# Patient Record
Sex: MALE | Race: BLACK OR AFRICAN AMERICAN | URBAN - METROPOLITAN AREA
[De-identification: names, ages, dates, MRNs, and addresses within clinical notes are randomized per-mention and may not be internally consistent; named-entity substitution may affect disease eponyms.]

---

## 2023-08-29 ENCOUNTER — OFFICE (OUTPATIENT)
Dept: URBAN - METROPOLITAN AREA CLINIC 19 | Facility: CLINIC | Age: 38
End: 2023-08-29

## 2023-08-29 VITALS
WEIGHT: 166 LBS | SYSTOLIC BLOOD PRESSURE: 118 MMHG | HEIGHT: 75 IN | HEART RATE: 70 BPM | OXYGEN SATURATION: 100 % | DIASTOLIC BLOOD PRESSURE: 75 MMHG

## 2023-08-29 DIAGNOSIS — E73.9 LACTOSE INTOLERANCE, UNSPECIFIED: ICD-10-CM

## 2023-08-29 DIAGNOSIS — R19.7 DIARRHEA, UNSPECIFIED: ICD-10-CM

## 2023-08-29 PROCEDURE — 99203 OFFICE O/P NEW LOW 30 MIN: CPT | Performed by: NURSE PRACTITIONER

## 2023-08-29 NOTE — SERVICEHPINOTES
Mr. Joel is a 38-year-old male with PMH including asthma, HH, robotic inguinal hernia repair in 2021 with mesh placement.br
darlene   He presents to clinic today with c/o diarrhea.  Onset was a little over 2 weeks ago.  States he is having diarrhea immediately postprandial about 3 per day.  Described as brown, loose / liquid stool.  Prior to this his baseline bowel regimen was soft stool every 2-3 days.
br
darleneHe has some crampy abdominal pain immediately preceding the BM.  Some mild anorexia.
br
brNo melena, hematochezia, fever, nausea / vomiting, heartburn, weight loss, rashes, rectal pain.
br
brNo NSAID use.  No herbals or supplements, new medications.  No recent antibiotics.  No well water.  He did travel to [place] this past weekend but no other traveling. He has lactose intolerance. 
br 
br
No h/o GB disease.

## 2023-08-31 LAB
CBC WITH DIFFERENTIAL/PLATELET: BASO (ABSOLUTE): 0 X10E3/UL (ref 0–0.2)
CBC WITH DIFFERENTIAL/PLATELET: BASOS: 0 %
CBC WITH DIFFERENTIAL/PLATELET: EOS (ABSOLUTE): 0.1 X10E3/UL (ref 0–0.4)
CBC WITH DIFFERENTIAL/PLATELET: EOS: 2 %
CBC WITH DIFFERENTIAL/PLATELET: HEMATOCRIT: 43.1 % (ref 37.5–51)
CBC WITH DIFFERENTIAL/PLATELET: HEMOGLOBIN: 14.2 G/DL (ref 13–17.7)
CBC WITH DIFFERENTIAL/PLATELET: IMMATURE GRANS (ABS): 0 X10E3/UL (ref 0–0.1)
CBC WITH DIFFERENTIAL/PLATELET: IMMATURE GRANULOCYTES: 0 %
CBC WITH DIFFERENTIAL/PLATELET: LYMPHS (ABSOLUTE): 1.2 X10E3/UL (ref 0.7–3.1)
CBC WITH DIFFERENTIAL/PLATELET: LYMPHS: 36 %
CBC WITH DIFFERENTIAL/PLATELET: MCH: 31.6 PG (ref 26.6–33)
CBC WITH DIFFERENTIAL/PLATELET: MCHC: 32.9 G/DL (ref 31.5–35.7)
CBC WITH DIFFERENTIAL/PLATELET: MCV: 96 FL (ref 79–97)
CBC WITH DIFFERENTIAL/PLATELET: MONOCYTES(ABSOLUTE): 0.3 X10E3/UL (ref 0.1–0.9)
CBC WITH DIFFERENTIAL/PLATELET: MONOCYTES: 9 %
CBC WITH DIFFERENTIAL/PLATELET: NEUTROPHILS (ABSOLUTE): 1.8 X10E3/UL (ref 1.4–7)
CBC WITH DIFFERENTIAL/PLATELET: NEUTROPHILS: 53 %
CBC WITH DIFFERENTIAL/PLATELET: PLATELETS: 231 X10E3/UL (ref 150–450)
CBC WITH DIFFERENTIAL/PLATELET: RBC: 4.5 X10E6/UL (ref 4.14–5.8)
CBC WITH DIFFERENTIAL/PLATELET: RDW: 12.8 % (ref 11.6–15.4)
CBC WITH DIFFERENTIAL/PLATELET: WBC: 3.4 X10E3/UL (ref 3.4–10.8)
CELIAC AB TTG DGP TIGA: DEAMIDATED GLIADIN ABS, IGA: 3 UNITS (ref 0–19)
CELIAC AB TTG DGP TIGA: DEAMIDATED GLIADIN ABS, IGG: 2 UNITS (ref 0–19)
CELIAC AB TTG DGP TIGA: IMMUNOGLOBULIN A, QN, SERUM: 158 MG/DL (ref 90–386)
CELIAC AB TTG DGP TIGA: T-TRANSGLUTAMINASE (TTG) IGA: <2 U/ML
CELIAC AB TTG DGP TIGA: T-TRANSGLUTAMINASE (TTG) IGG: <2 U/ML
COMP. METABOLIC PANEL (14): A/G RATIO: 1.9 (ref 1.2–2.2)
COMP. METABOLIC PANEL (14): ALBUMIN: 4.5 G/DL (ref 4.1–5.1)
COMP. METABOLIC PANEL (14): ALKALINE PHOSPHATASE: 52 IU/L (ref 44–121)
COMP. METABOLIC PANEL (14): ALT (SGPT): 11 IU/L (ref 0–44)
COMP. METABOLIC PANEL (14): AST (SGOT): 15 IU/L (ref 0–40)
COMP. METABOLIC PANEL (14): BILIRUBIN, TOTAL: <0.2 MG/DL
COMP. METABOLIC PANEL (14): BUN/CREATININE RATIO: 9 (ref 9–20)
COMP. METABOLIC PANEL (14): BUN: 11 MG/DL (ref 6–20)
COMP. METABOLIC PANEL (14): CALCIUM: 9.3 MG/DL (ref 8.7–10.2)
COMP. METABOLIC PANEL (14): CARBON DIOXIDE, TOTAL: 26 MMOL/L (ref 20–29)
COMP. METABOLIC PANEL (14): CHLORIDE: 100 MMOL/L (ref 96–106)
COMP. METABOLIC PANEL (14): CREATININE: 1.18 MG/DL (ref 0.76–1.27)
COMP. METABOLIC PANEL (14): EGFR: 82 ML/MIN/1.73 (ref 59–?)
COMP. METABOLIC PANEL (14): GLOBULIN, TOTAL: 2.4 G/DL (ref 1.5–4.5)
COMP. METABOLIC PANEL (14): GLUCOSE: 78 MG/DL (ref 70–99)
COMP. METABOLIC PANEL (14): POTASSIUM: 4.4 MMOL/L (ref 3.5–5.2)
COMP. METABOLIC PANEL (14): PROTEIN, TOTAL: 6.9 G/DL (ref 6–8.5)
COMP. METABOLIC PANEL (14): SODIUM: 137 MMOL/L (ref 134–144)

## 2023-09-03 LAB
